# Patient Record
Sex: MALE | Race: WHITE | URBAN - METROPOLITAN AREA
[De-identification: names, ages, dates, MRNs, and addresses within clinical notes are randomized per-mention and may not be internally consistent; named-entity substitution may affect disease eponyms.]

---

## 2020-01-14 ENCOUNTER — OFFICE VISIT (OUTPATIENT)
Dept: URBAN - METROPOLITAN AREA CLINIC 81 | Facility: CLINIC | Age: 80
End: 2020-01-14
Payer: MEDICARE

## 2020-01-14 DIAGNOSIS — H52.4 PRESBYOPIA: ICD-10-CM

## 2020-01-14 DIAGNOSIS — H35.3131 BILATERAL NONEXUDATIVE AGE-RELATED MACULAR DEGENERATION, EARLY DRY STAGE: Primary | ICD-10-CM

## 2020-01-14 PROCEDURE — 92134 CPTRZ OPH DX IMG PST SGM RTA: CPT | Performed by: OPTOMETRIST

## 2020-01-14 PROCEDURE — 92014 COMPRE OPH EXAM EST PT 1/>: CPT | Performed by: OPTOMETRIST

## 2020-01-14 PROCEDURE — 92004 COMPRE OPH EXAM NEW PT 1/>: CPT | Performed by: OPTOMETRIST

## 2020-01-14 ASSESSMENT — KERATOMETRY
OD: 44.75
OS: 44.38

## 2020-01-14 ASSESSMENT — VISUAL ACUITY
OD: 20/40
OS: 20/40

## 2020-01-14 ASSESSMENT — INTRAOCULAR PRESSURE
OS: 16
OD: 16

## 2020-01-14 NOTE — IMPRESSION/PLAN
Impression: Bilateral nonexudative age-related macular degeneration, early dry stage: H35.3131. Plan: Discussed AREDs formula - Recommended for moderate ARMD - OK to hold off for now or use as directed. OCT shows RPE changes w/ Drusen OU

RTC 1 year/PRN if vision worsens. 80

## 2020-01-14 NOTE — IMPRESSION/PLAN
Impression: Combined forms of age-related cataract, bilateral: H25.813. Plan: Discussed signs and symptoms of cataract progression. Pt to RTC PRN in the event of changes to visual status. No recommendation for surgery at present time. Will continue to monitor. 

RTC 1 year/PRN

## 2021-02-09 ENCOUNTER — OFFICE VISIT (OUTPATIENT)
Dept: URBAN - METROPOLITAN AREA CLINIC 81 | Facility: CLINIC | Age: 81
End: 2021-02-09
Payer: MEDICARE

## 2021-02-09 DIAGNOSIS — H35.3132 NONEXUDATIVE AGE-RELATED MACULAR DEGENERATION, BILATERAL, INTERMEDIATE DRY STAGE: Primary | Chronic | ICD-10-CM

## 2021-02-09 DIAGNOSIS — H02.423 MYOGENIC PTOSIS OF BILATERAL EYELIDS: Chronic | ICD-10-CM

## 2021-02-09 DIAGNOSIS — H25.813 COMBINED FORMS OF AGE-RELATED CATARACT, BILATERAL: Chronic | ICD-10-CM

## 2021-02-09 PROCEDURE — 99214 OFFICE O/P EST MOD 30 MIN: CPT | Performed by: OPTOMETRIST

## 2021-02-09 PROCEDURE — 92134 CPTRZ OPH DX IMG PST SGM RTA: CPT | Performed by: OPTOMETRIST

## 2021-02-09 ASSESSMENT — VISUAL ACUITY
OS: 20/40
OD: 20/50

## 2021-02-09 ASSESSMENT — INTRAOCULAR PRESSURE
OD: 12
OS: 12

## 2021-02-09 ASSESSMENT — KERATOMETRY
OD: 44.75
OS: 45.50

## 2021-02-09 NOTE — IMPRESSION/PLAN
Impression: Nonexudative age-related macular degeneration, bilateral, intermediate dry stage: H35.3132. Plan: Macular degeneration, dry type with stable vision. Education on dry versus wet ARMD. Dispensed pamphlet on ARMD and Amsler grid. Patient advised to RTC immediately if any vision changes occur. Education on AREDS 2 supplements PO. Order OCT macula, reveals drusen formation, no SRF or IRF OU.

## 2021-02-09 NOTE — IMPRESSION/PLAN
Impression: Myogenic ptosis of bilateral eyelids: H02.423. Plan: Discussed diagnosis with patient in detail. No treatment is required at this time. Will continue to observe condition and/or symptoms. Patient instructed to call if condition gets worse.

## 2023-02-01 ENCOUNTER — OFFICE VISIT (OUTPATIENT)
Dept: URBAN - METROPOLITAN AREA CLINIC 81 | Facility: CLINIC | Age: 83
End: 2023-02-01
Payer: MEDICARE

## 2023-02-01 DIAGNOSIS — H02.413 MECHANICAL PTOSIS OF BILATERAL EYELIDS: ICD-10-CM

## 2023-02-01 DIAGNOSIS — H35.3132 NONEXUDATIVE AGE-RELATED MACULAR DEGENERATION, BILATERAL, INTERMEDIATE DRY STAGE: Primary | ICD-10-CM

## 2023-02-01 DIAGNOSIS — H25.813 COMBINED FORMS OF AGE-RELATED CATARACT, BILATERAL: ICD-10-CM

## 2023-02-01 PROCEDURE — 92134 CPTRZ OPH DX IMG PST SGM RTA: CPT | Performed by: OPTOMETRIST

## 2023-02-01 PROCEDURE — 99213 OFFICE O/P EST LOW 20 MIN: CPT | Performed by: OPTOMETRIST

## 2023-02-01 ASSESSMENT — INTRAOCULAR PRESSURE
OD: 15
OS: 15

## 2023-02-01 ASSESSMENT — KERATOMETRY
OD: 44.50
OS: 45.13

## 2023-02-01 NOTE — IMPRESSION/PLAN
Impression: Mechanical ptosis of bilateral eyelids: H02.413.

-affecting daily life OU Plan: Discussed diagnosis with patient in detail. Refer to oculoplastics for evaluation.

## 2023-02-01 NOTE — IMPRESSION/PLAN
Impression: Nonexudative age-related macular degeneration, bilateral, intermediate dry stage: H35.3132.

-stable OU
-02/01/2023 order OCT mac, reviewed today, drusen with no SRF/IRF OU, stable OU Plan: Macular degeneration, dry type with stable vision. Education on dry versus wet ARMD. Patient advised to RTC immediately if any vision changes occur. Continue AREDS 2 supplements PO. Monitor.

## 2023-02-01 NOTE — IMPRESSION/PLAN
Impression: Combined forms of age-related cataract, bilateral: H25.813. Plan: Observe, stable OU. No treatment currently recommended as cataracts do not affect the patients day to day life. Patient to monitor for vision changes and if vision significantly worsens, advised to RTC for evaluation.